# Patient Record
Sex: FEMALE | Race: WHITE | NOT HISPANIC OR LATINO | Employment: UNEMPLOYED | URBAN - METROPOLITAN AREA
[De-identification: names, ages, dates, MRNs, and addresses within clinical notes are randomized per-mention and may not be internally consistent; named-entity substitution may affect disease eponyms.]

---

## 2019-07-09 ENCOUNTER — OFFICE VISIT (OUTPATIENT)
Dept: FAMILY MEDICINE | Facility: CLINIC | Age: 49
End: 2019-07-09
Payer: COMMERCIAL

## 2019-07-09 VITALS
BODY MASS INDEX: 27.84 KG/M2 | OXYGEN SATURATION: 95 % | DIASTOLIC BLOOD PRESSURE: 70 MMHG | HEART RATE: 79 BPM | HEIGHT: 71 IN | WEIGHT: 198.88 LBS | SYSTOLIC BLOOD PRESSURE: 102 MMHG | TEMPERATURE: 98 F

## 2019-07-09 DIAGNOSIS — Z00.00 ANNUAL PHYSICAL EXAM: Primary | ICD-10-CM

## 2019-07-09 DIAGNOSIS — M50.90 CERVICAL DISC DISEASE: ICD-10-CM

## 2019-07-09 PROCEDURE — 90471 IMMUNIZATION ADMIN: CPT | Mod: S$GLB,,, | Performed by: INTERNAL MEDICINE

## 2019-07-09 PROCEDURE — 90715 TDAP VACCINE GREATER THAN OR EQUAL TO 7YO IM: ICD-10-PCS | Mod: S$GLB,,, | Performed by: INTERNAL MEDICINE

## 2019-07-09 PROCEDURE — 99999 PR PBB SHADOW E&M-EST. PATIENT-LVL IV: CPT | Mod: PBBFAC,,, | Performed by: INTERNAL MEDICINE

## 2019-07-09 PROCEDURE — 99999 PR PBB SHADOW E&M-EST. PATIENT-LVL IV: ICD-10-PCS | Mod: PBBFAC,,, | Performed by: INTERNAL MEDICINE

## 2019-07-09 PROCEDURE — 90715 TDAP VACCINE 7 YRS/> IM: CPT | Mod: S$GLB,,, | Performed by: INTERNAL MEDICINE

## 2019-07-09 PROCEDURE — 99386 PREV VISIT NEW AGE 40-64: CPT | Mod: 25,S$GLB,, | Performed by: INTERNAL MEDICINE

## 2019-07-09 PROCEDURE — 99386 PR PREVENTIVE VISIT,NEW,40-64: ICD-10-PCS | Mod: 25,S$GLB,, | Performed by: INTERNAL MEDICINE

## 2019-07-09 PROCEDURE — 90471 TDAP VACCINE GREATER THAN OR EQUAL TO 7YO IM: ICD-10-PCS | Mod: S$GLB,,, | Performed by: INTERNAL MEDICINE

## 2019-07-09 RX ORDER — BROMPHENIRAMINE MALEATE, DEXTROMETHORPHAN HBR, PHENYLEPHRINE HCL, DIPHENHYDRAMINE HCL, PHENYLEPHRINE HCL 0.52G
KIT ORAL
COMMUNITY

## 2019-07-09 NOTE — PROGRESS NOTES
Subjective:       Patient ID: Francesca Garcia is a 48 y.o. female.    Chief Complaint: Establish Care (needs referral for spine)    Pt here to get established.  She lives in [place] [facility] for 9/12 months. She has cervical stenosis that has caused her right hand to go numb.  She has pain turning her head. She would like neurosurg referral.  She gets mammograms over in [place].  She exercises daily.  She is utd gyn exam.     Review of Systems   Constitutional: Negative for fatigue, fever and unexpected weight change.   HENT: Negative for congestion, postnasal drip, sinus pain and sore throat.    Eyes: Negative for visual disturbance.   Respiratory: Negative for cough, chest tightness, shortness of breath and wheezing.    Cardiovascular: Negative for chest pain, palpitations and leg swelling.   Gastrointestinal: Negative for abdominal pain, blood in stool, constipation, diarrhea, nausea and vomiting.   Endocrine: Negative for cold intolerance and heat intolerance.   Genitourinary: Negative for difficulty urinating, dysuria and frequency.   Musculoskeletal: Negative for back pain, joint swelling and myalgias.   Skin: Negative for rash.   Allergic/Immunologic: Negative for environmental allergies.   Neurological: Negative for dizziness, seizures, numbness and headaches.   Hematological: Does not bruise/bleed easily.   Psychiatric/Behavioral: Negative for agitation and sleep disturbance.       Objective:      Physical Exam   Constitutional: She is oriented to person, place, and time. She appears well-developed and well-nourished.   HENT:   Head: Normocephalic and atraumatic.   Mouth/Throat: Oropharynx is clear and moist.   Eyes: Pupils are equal, round, and reactive to light. Conjunctivae and EOM are normal.   Neck: Normal range of motion. Neck supple. No thyromegaly present.   Cardiovascular: Normal rate, regular rhythm, normal heart sounds and intact distal pulses. Exam reveals no gallop and no friction rub.   No  murmur heard.  Pulmonary/Chest: Effort normal and breath sounds normal. No respiratory distress. She has no wheezes. She has no rales.   Abdominal: Soft. Bowel sounds are normal. She exhibits no distension. There is no tenderness.   Musculoskeletal: Normal range of motion. She exhibits no edema.   Lymphadenopathy:     She has no cervical adenopathy.   Neurological: She is alert and oriented to person, place, and time. No cranial nerve deficit.   Skin: Skin is warm and dry.   Psychiatric: She has a normal mood and affect. Her behavior is normal.       Assessment:       1. Annual physical exam    2. Cervical disc disease        Plan:       Annual- dtap today.  Ordered labs. utd gyn exam.  Exercising. Nonsmoker    Cervical stenosis- referred to neuro

## 2019-07-10 ENCOUNTER — LAB VISIT (OUTPATIENT)
Dept: LAB | Facility: HOSPITAL | Age: 49
End: 2019-07-10
Attending: INTERNAL MEDICINE
Payer: COMMERCIAL

## 2019-07-10 DIAGNOSIS — Z00.00 ANNUAL PHYSICAL EXAM: ICD-10-CM

## 2019-07-10 LAB
ALBUMIN SERPL BCP-MCNC: 3.8 G/DL (ref 3.5–5.2)
ALP SERPL-CCNC: 55 U/L (ref 55–135)
ALT SERPL W/O P-5'-P-CCNC: 16 U/L (ref 10–44)
ANION GAP SERPL CALC-SCNC: 7 MMOL/L (ref 8–16)
AST SERPL-CCNC: 21 U/L (ref 10–40)
BASOPHILS # BLD AUTO: 0.07 K/UL (ref 0–0.2)
BASOPHILS NFR BLD: 1.4 % (ref 0–1.9)
BILIRUB SERPL-MCNC: 1.2 MG/DL (ref 0.1–1)
BUN SERPL-MCNC: 15 MG/DL (ref 6–20)
CALCIUM SERPL-MCNC: 9.9 MG/DL (ref 8.7–10.5)
CHLORIDE SERPL-SCNC: 103 MMOL/L (ref 95–110)
CHOLEST SERPL-MCNC: 195 MG/DL (ref 120–199)
CHOLEST/HDLC SERPL: 2.5 {RATIO} (ref 2–5)
CO2 SERPL-SCNC: 28 MMOL/L (ref 23–29)
CREAT SERPL-MCNC: 0.8 MG/DL (ref 0.5–1.4)
DIFFERENTIAL METHOD: NORMAL
EOSINOPHIL # BLD AUTO: 0.2 K/UL (ref 0–0.5)
EOSINOPHIL NFR BLD: 3.8 % (ref 0–8)
ERYTHROCYTE [DISTWIDTH] IN BLOOD BY AUTOMATED COUNT: 13.1 % (ref 11.5–14.5)
EST. GFR  (AFRICAN AMERICAN): >60 ML/MIN/1.73 M^2
EST. GFR  (NON AFRICAN AMERICAN): >60 ML/MIN/1.73 M^2
GLUCOSE SERPL-MCNC: 87 MG/DL (ref 70–110)
HCT VFR BLD AUTO: 46.3 % (ref 37–48.5)
HDLC SERPL-MCNC: 77 MG/DL (ref 40–75)
HDLC SERPL: 39.5 % (ref 20–50)
HGB BLD-MCNC: 15.1 G/DL (ref 12–16)
IMM GRANULOCYTES # BLD AUTO: 0.01 K/UL (ref 0–0.04)
IMM GRANULOCYTES NFR BLD AUTO: 0.2 % (ref 0–0.5)
LDLC SERPL CALC-MCNC: 108.2 MG/DL (ref 63–159)
LYMPHOCYTES # BLD AUTO: 1.6 K/UL (ref 1–4.8)
LYMPHOCYTES NFR BLD: 31.5 % (ref 18–48)
MCH RBC QN AUTO: 30.6 PG (ref 27–31)
MCHC RBC AUTO-ENTMCNC: 32.6 G/DL (ref 32–36)
MCV RBC AUTO: 94 FL (ref 82–98)
MONOCYTES # BLD AUTO: 0.6 K/UL (ref 0.3–1)
MONOCYTES NFR BLD: 12 % (ref 4–15)
NEUTROPHILS # BLD AUTO: 2.6 K/UL (ref 1.8–7.7)
NEUTROPHILS NFR BLD: 51.1 % (ref 38–73)
NONHDLC SERPL-MCNC: 118 MG/DL
NRBC BLD-RTO: 0 /100 WBC
PLATELET # BLD AUTO: 235 K/UL (ref 150–350)
PMV BLD AUTO: 10.9 FL (ref 9.2–12.9)
POTASSIUM SERPL-SCNC: 4.5 MMOL/L (ref 3.5–5.1)
PROT SERPL-MCNC: 7.2 G/DL (ref 6–8.4)
RBC # BLD AUTO: 4.93 M/UL (ref 4–5.4)
SODIUM SERPL-SCNC: 138 MMOL/L (ref 136–145)
TRIGL SERPL-MCNC: 49 MG/DL (ref 30–150)
TSH SERPL DL<=0.005 MIU/L-ACNC: 1.58 UIU/ML (ref 0.4–4)
WBC # BLD AUTO: 5.01 K/UL (ref 3.9–12.7)

## 2019-07-10 PROCEDURE — 80061 LIPID PANEL: CPT

## 2019-07-10 PROCEDURE — 36415 COLL VENOUS BLD VENIPUNCTURE: CPT | Mod: PO

## 2019-07-10 PROCEDURE — 84443 ASSAY THYROID STIM HORMONE: CPT

## 2019-07-10 PROCEDURE — 85025 COMPLETE CBC W/AUTO DIFF WBC: CPT

## 2019-07-10 PROCEDURE — 80053 COMPREHEN METABOLIC PANEL: CPT

## 2019-07-16 ENCOUNTER — TELEPHONE (OUTPATIENT)
Dept: FAMILY MEDICINE | Facility: CLINIC | Age: 49
End: 2019-07-16

## 2019-07-16 DIAGNOSIS — M50.90 CERVICAL DISC DISEASE: Primary | ICD-10-CM

## 2019-07-16 NOTE — TELEPHONE ENCOUNTER
----- Message from Nidia Cantu sent at 7/16/2019  4:24 PM CDT -----    Pt  Is calling  Back to  Change the  Name  Of the spine  Surgeon// a  Ref   Request  Has  Already  Been  Sent  Pt is  changing the  Dr  Name  Krishna Baldwin // please call  For details [phone]   Fax # for  Dr  Is [phone]    
Please see previous message. She want Referral to be send to DR. Krishna Baldwin  
none

## 2019-07-16 NOTE — TELEPHONE ENCOUNTER
----- Message from Marco A Blood sent at 7/16/2019  2:18 PM CDT -----  Contact: Patient  Type: Needs Medical Advice    Who Called:  Patient  Best Call Back Number: [phone]  Additional Information: Patient would like referral for neurosurgery to be sent to the [facility] back/spine clinic. Fax number is [phone]. Please call to advise. Thanks!

## 2019-07-17 ENCOUNTER — TELEPHONE (OUTPATIENT)
Dept: FAMILY MEDICINE | Facility: CLINIC | Age: 49
End: 2019-07-17

## 2019-07-18 ENCOUNTER — TELEPHONE (OUTPATIENT)
Dept: FAMILY MEDICINE | Facility: CLINIC | Age: 49
End: 2019-07-18

## 2019-08-09 ENCOUNTER — OFFICE VISIT (OUTPATIENT)
Dept: NEUROSURGERY | Facility: CLINIC | Age: 49
End: 2019-08-09
Payer: COMMERCIAL

## 2019-08-09 VITALS — HEART RATE: 80 BPM | SYSTOLIC BLOOD PRESSURE: 115 MMHG | DIASTOLIC BLOOD PRESSURE: 70 MMHG

## 2019-08-09 DIAGNOSIS — M54.2 NECK PAIN: Primary | ICD-10-CM

## 2019-08-09 DIAGNOSIS — M50.30 DDD (DEGENERATIVE DISC DISEASE), CERVICAL: ICD-10-CM

## 2019-08-09 DIAGNOSIS — R20.0 NUMBNESS IN BOTH HANDS: ICD-10-CM

## 2019-08-09 PROCEDURE — 99999 PR PBB SHADOW E&M-EST. PATIENT-LVL III: CPT | Mod: PBBFAC,,, | Performed by: PHYSICIAN ASSISTANT

## 2019-08-09 PROCEDURE — 99204 OFFICE O/P NEW MOD 45 MIN: CPT | Mod: S$GLB,,, | Performed by: PHYSICIAN ASSISTANT

## 2019-08-09 PROCEDURE — 99204 PR OFFICE/OUTPT VISIT, NEW, LEVL IV, 45-59 MIN: ICD-10-PCS | Mod: S$GLB,,, | Performed by: PHYSICIAN ASSISTANT

## 2019-08-09 PROCEDURE — 99999 PR PBB SHADOW E&M-EST. PATIENT-LVL III: ICD-10-PCS | Mod: PBBFAC,,, | Performed by: PHYSICIAN ASSISTANT

## 2019-08-09 NOTE — LETTER
August 15, 2019      Sandy Burton MD  [address] [facility] [address] [place] [address]           [place] - Neurosurgery  [address] [facility] [address] [place] [address]  Phone: [phone]  Fax: [phone]          Patient: Francesca Garcia   MR Number: [id number]   YOB: 1970   Date of Visit: 8/9/2019       Dear Dr. Sandy Burton:    Thank you for referring Francesca Garcia to me for evaluation. Attached you will find relevant portions of my assessment and plan of care.    If you have questions, please do not hesitate to call me. I look forward to following Francesca Garcia along with you.    Sincerely,    Patsy Pedro PA-C    Enclosure  CC:  No Recipients    If you would like to receive this communication electronically, please contact [email or web address] or [phone] to request more information on [facility] access.    For providers and/or their staff who would like to refer a patient to [facility], please contact us through our one-stop-shop provider referral line, [facility] , at [phone].    If you feel you have received this communication in error or would no longer like to receive these types of communications, please e-mail [email or web address]

## 2019-08-09 NOTE — PROGRESS NOTES
[facility] Neurosurgery  Clinic Consult     Consult Requested By: Sandy Burton MD  PCP: Sandy Burton MD    Chief Complaint:   Chief Complaint   Patient presents with    Cervical Spine Pain (C-spine)     patient reports cervical pain; bilateral hands tingling; pain 4/10         History reviewed. No pertinent past medical history.  Past Surgical History:   Procedure Laterality Date    BREAST BIOPSY Left 2008    not sure of date-@ 's office    KIDNEY STONE SURGERY       Family History   Problem Relation Age of Onset    Cancer Maternal Aunt      Social History     Tobacco Use    Smoking status: Former Smoker     Years: 15.00   Substance Use Topics    Alcohol use: Not on file    Drug use: Not on file      Review of patient's allergies indicates:  No Known Allergies    Current Outpatient Medications:     multivitamin capsule, Take 1 capsule by mouth once daily., Disp: , Rfl:     psyllium 0.52 gram capsule, Fiber (psyllium husk) 0.52 gram capsule  Take 4 capsules every day by oral route for 30 days., Disp: , Rfl:     VITAMIN B COMP AND VIT C NO.6 (VITAMIN B COMP WITH VIT C NO.6 ORAL), Take 1,200 mcg by mouth., Disp: , Rfl:     Review of Systems:   Constitutional: no fever, chills or night sweats. No changes in weight   Eyes: no visual changes   ENT: no nasal congestion or sore throat   Respiratory: no cough or shortness of breath   Cardiovascular: no chest pain or palpitations   Gastrointestinal: no nausea or vomiting   Genitourinary: no hematuria or dysuria   Integument/Breast: no rash or pruritis   Hematologic/Lymphatic: no easy bruising or lymphadenopathy   Musculoskeletal: +myalgias, neck pain   Neurological: no seizures or tremors +numbness  Behavioral/Psych: no auditory or visual hallucinations   Endocrine: no heat or cold intolerance         OBJECTIVE:     Vital Signs (Most Recent):  Pulse: 80 (08/09/19 1417)  BP: 115/70 (08/09/19 1417)    Physical Exam:   General: well developed, well  nourished, no distress.   Neurologic: Alert and oriented. Thought content appropriate. GCS 15.   Head: normocephalic, atraumatic  Eyes:EOMI.  Neck: trachea midline, no JVD   Cardiovascular: no LE edema  Pulmonary: normal respirations, no signs of respiratory distress  Abdomen: non-distended  Sensory: intact to light touch throughout  Skin: Skin is warm, dry and intact.    Motor Strength: Moves all extremities spontaneously with good tone. No abnormal movements seen.     Strength  Deltoids Triceps Biceps Wrist Extension Wrist Flexion Hand  Interossei   Upper: R 5/5 5/5 5/5 5/5 5/5 5/5 5/5    L 5/5 5/5 5/5 5/5 5/5 5/5 5/5     DTR's: 2 + and symmetric in UE and LE  Zamorano: absent  Gait: normal    Tandem Gait: No difficulty           Able to walk on heels & toes  Cervical Spine: full ROM, no TTP         Provider Dictation:     Francesca Garcia is a 48 y.o. right handed female who presents for evaluation of neck pain and hand numbness. Patient reports she has been experiencing these symptoms for a few years. She previously had a cervical spine MRI 2 years ago and was told she has stenosis in the cervical spine. She currently is experiencing pain in the right neck. She has numbness in bilateral hands, R>L. She reports weakness in her hands. Denies dropping objects or gait instability. She lives part time in [facility]. She returns next week and will not come back to [place] until October. She has done therapy, but has not received injections with pain management.     No imaging available.     On exam, patient has a fluid gait, no difficulty with tandem. She is full strength, 2+ reflexes and negative Zamorano's     VAS 4/10 neck   PHQ 0  Neck Disability Index 20    Given the above, I recommend MRI cervical spine and x-rays with flex/ex. Patient will complete after she returns in October. I will call once complete to give further recommendations.     Patient verbalized understanding of plan. Encouraged to call with  any questions or concerns.     Neck pain  -     MRI Cervical Spine Without Contrast; Future; Expected date: 08/09/2019  -     X-Ray Cervical Spine AP Lat with Flexion  Extension; Future; Expected date: 08/09/2019    DDD (degenerative disc disease), cervical  -     MRI Cervical Spine Without Contrast; Future; Expected date: 08/09/2019  -     X-Ray Cervical Spine AP Lat with Flexion  Extension; Future; Expected date: 08/09/2019    Numbness in both hands  -     MRI Cervical Spine Without Contrast; Future; Expected date: 08/09/2019  -     X-Ray Cervical Spine AP Lat with Flexion  Extension; Future; Expected date: 08/09/2019

## 2020-01-03 DIAGNOSIS — Z12.11 COLON CANCER SCREENING: ICD-10-CM

## 2020-10-05 ENCOUNTER — PATIENT MESSAGE (OUTPATIENT)
Dept: ADMINISTRATIVE | Facility: HOSPITAL | Age: 50
End: 2020-10-05

## 2021-01-04 ENCOUNTER — PATIENT MESSAGE (OUTPATIENT)
Dept: ADMINISTRATIVE | Facility: HOSPITAL | Age: 51
End: 2021-01-04

## 2021-01-06 DIAGNOSIS — Z12.31 OTHER SCREENING MAMMOGRAM: ICD-10-CM

## 2021-04-06 ENCOUNTER — PATIENT MESSAGE (OUTPATIENT)
Dept: ADMINISTRATIVE | Facility: HOSPITAL | Age: 51
End: 2021-04-06

## 2021-05-10 ENCOUNTER — PATIENT MESSAGE (OUTPATIENT)
Dept: RESEARCH | Facility: HOSPITAL | Age: 51
End: 2021-05-10

## 2021-07-07 ENCOUNTER — PATIENT MESSAGE (OUTPATIENT)
Dept: ADMINISTRATIVE | Facility: HOSPITAL | Age: 51
End: 2021-07-07

## 2022-03-16 DIAGNOSIS — J32.8 OTHER CHRONIC SINUSITIS: Primary | ICD-10-CM

## 2022-03-22 ENCOUNTER — HOSPITAL ENCOUNTER (OUTPATIENT)
Dept: RADIOLOGY | Facility: HOSPITAL | Age: 52
Discharge: HOME | End: 2022-03-22
Attending: OTOLARYNGOLOGY
Payer: COMMERCIAL

## 2022-03-22 DIAGNOSIS — J32.8 OTHER CHRONIC SINUSITIS: ICD-10-CM

## 2022-03-22 PROCEDURE — 70486 CT MAXILLOFACIAL W/O DYE: CPT | Mod: TC,PO

## 2022-04-22 ENCOUNTER — ANESTHESIA EVENT (OUTPATIENT)
Dept: SURGERY | Facility: AMBULARY SURGERY CENTER | Age: 52
End: 2022-04-22
Payer: COMMERCIAL

## 2022-04-22 ENCOUNTER — HOSPITAL ENCOUNTER (OUTPATIENT)
Dept: CARDIOLOGY | Facility: HOSPITAL | Age: 52
Discharge: HOME | End: 2022-04-22
Attending: ANESTHESIOLOGY
Payer: COMMERCIAL

## 2022-04-22 DIAGNOSIS — Z01.818 PREOP TESTING: Primary | ICD-10-CM

## 2022-04-22 DIAGNOSIS — Z01.818 PREOP TESTING: ICD-10-CM

## 2022-04-22 PROCEDURE — 93010 EKG 12-LEAD: ICD-10-PCS | Mod: ,,, | Performed by: INTERNAL MEDICINE

## 2022-04-22 PROCEDURE — 93005 ELECTROCARDIOGRAM TRACING: CPT

## 2022-04-22 PROCEDURE — 93010 ELECTROCARDIOGRAM REPORT: CPT | Mod: ,,, | Performed by: INTERNAL MEDICINE

## 2022-04-22 RX ORDER — HYDROCODONE BITARTRATE AND ACETAMINOPHEN 5; 325 MG/1; MG/1
1 TABLET ORAL EVERY 6 HOURS PRN
COMMUNITY

## 2022-04-25 ENCOUNTER — ANESTHESIA (OUTPATIENT)
Dept: SURGERY | Facility: AMBULARY SURGERY CENTER | Age: 52
End: 2022-04-25
Payer: COMMERCIAL

## 2022-04-25 ENCOUNTER — HOSPITAL ENCOUNTER (OUTPATIENT)
Facility: AMBULARY SURGERY CENTER | Age: 52
Discharge: HOME | End: 2022-04-25
Attending: OTOLARYNGOLOGY | Admitting: OTOLARYNGOLOGY
Payer: COMMERCIAL

## 2022-04-25 DIAGNOSIS — J32.2 CHRONIC ETHMOIDAL SINUSITIS: ICD-10-CM

## 2022-04-25 DIAGNOSIS — J01.41 ACUTE RECURRENT PANSINUSITIS: Primary | ICD-10-CM

## 2022-04-25 LAB
B-HCG UR QL: NEGATIVE
CTP QC/QA: YES

## 2022-04-25 PROCEDURE — D9220A PRA ANESTHESIA: ICD-10-PCS | Mod: ANES,,, | Performed by: ANESTHESIOLOGY

## 2022-04-25 PROCEDURE — 31267 ENDOSCOPY MAXILLARY SINUS: CPT | Performed by: OTOLARYNGOLOGY

## 2022-04-25 PROCEDURE — 31276 NSL/SINS NDSC FRNT TISS RMVL: CPT | Mod: LT

## 2022-04-25 PROCEDURE — 31267 ENDOSCOPY MAXILLARY SINUS: CPT | Mod: LT

## 2022-04-25 PROCEDURE — D9220A PRA ANESTHESIA: Mod: CRNA,,, | Performed by: NURSE ANESTHETIST, CERTIFIED REGISTERED

## 2022-04-25 PROCEDURE — 88305 TISSUE EXAM BY PATHOLOGIST: CPT | Mod: 26,,, | Performed by: PATHOLOGY

## 2022-04-25 PROCEDURE — 30130 EXCISE INFERIOR TURBINATE: CPT | Mod: RT | Performed by: OTOLARYNGOLOGY

## 2022-04-25 PROCEDURE — 31259 NSL/SINS NDSC SPHN TISS RMVL: CPT | Mod: RT

## 2022-04-25 PROCEDURE — 30140 RESECT INFERIOR TURBINATE: CPT | Mod: LT | Performed by: OTOLARYNGOLOGY

## 2022-04-25 PROCEDURE — 31276 NSL/SINS NDSC FRNT TISS RMVL: CPT | Performed by: OTOLARYNGOLOGY

## 2022-04-25 PROCEDURE — D9220A PRA ANESTHESIA: Mod: ANES,,, | Performed by: ANESTHESIOLOGY

## 2022-04-25 PROCEDURE — D9220A PRA ANESTHESIA: ICD-10-PCS | Mod: CRNA,,, | Performed by: NURSE ANESTHETIST, CERTIFIED REGISTERED

## 2022-04-25 PROCEDURE — 88305 TISSUE EXAM BY PATHOLOGIST: CPT | Performed by: PATHOLOGY

## 2022-04-25 PROCEDURE — 88305 TISSUE EXAM BY PATHOLOGIST: ICD-10-PCS | Mod: 26,,, | Performed by: PATHOLOGY

## 2022-04-25 PROCEDURE — 61782 SCAN PROC CRANIAL EXTRA: CPT

## 2022-04-25 PROCEDURE — 30520 REPAIR OF NASAL SEPTUM: CPT | Performed by: OTOLARYNGOLOGY

## 2022-04-25 RX ORDER — PROMETHAZINE HYDROCHLORIDE 25 MG/ML
INJECTION, SOLUTION INTRAMUSCULAR; INTRAVENOUS
Status: DISCONTINUED | OUTPATIENT
Start: 2022-04-25 | End: 2022-04-25

## 2022-04-25 RX ORDER — FENTANYL CITRATE 50 UG/ML
25 INJECTION, SOLUTION INTRAMUSCULAR; INTRAVENOUS EVERY 5 MIN PRN
Status: DISCONTINUED | OUTPATIENT
Start: 2022-04-25 | End: 2022-04-25 | Stop reason: HOSPADM

## 2022-04-25 RX ORDER — MIDAZOLAM HYDROCHLORIDE 1 MG/ML
INJECTION INTRAMUSCULAR; INTRAVENOUS
Status: DISCONTINUED | OUTPATIENT
Start: 2022-04-25 | End: 2022-04-25

## 2022-04-25 RX ORDER — SODIUM CHLORIDE 0.9 % (FLUSH) 0.9 %
3 SYRINGE (ML) INJECTION EVERY 8 HOURS
Status: DISCONTINUED | OUTPATIENT
Start: 2022-04-25 | End: 2022-04-25 | Stop reason: HOSPADM

## 2022-04-25 RX ORDER — ROCURONIUM BROMIDE 10 MG/ML
INJECTION, SOLUTION INTRAVENOUS
Status: DISCONTINUED | OUTPATIENT
Start: 2022-04-25 | End: 2022-04-25

## 2022-04-25 RX ORDER — EPHEDRINE SULFATE 50 MG/ML
INJECTION, SOLUTION INTRAVENOUS
Status: DISCONTINUED | OUTPATIENT
Start: 2022-04-25 | End: 2022-04-25

## 2022-04-25 RX ORDER — LIDOCAINE HYDROCHLORIDE AND EPINEPHRINE 10; 10 MG/ML; UG/ML
INJECTION, SOLUTION INFILTRATION; PERINEURAL
Status: DISCONTINUED | OUTPATIENT
Start: 2022-04-25 | End: 2022-04-25 | Stop reason: HOSPADM

## 2022-04-25 RX ORDER — FENTANYL CITRATE 50 UG/ML
INJECTION, SOLUTION INTRAMUSCULAR; INTRAVENOUS
Status: DISCONTINUED | OUTPATIENT
Start: 2022-04-25 | End: 2022-04-25

## 2022-04-25 RX ORDER — TRANEXAMIC ACID 100 MG/ML
INJECTION, SOLUTION INTRAVENOUS
Status: DISCONTINUED | OUTPATIENT
Start: 2022-04-25 | End: 2022-04-25

## 2022-04-25 RX ORDER — SODIUM CHLORIDE, SODIUM LACTATE, POTASSIUM CHLORIDE, CALCIUM CHLORIDE 600; 310; 30; 20 MG/100ML; MG/100ML; MG/100ML; MG/100ML
INJECTION, SOLUTION INTRAVENOUS CONTINUOUS
Status: DISCONTINUED | OUTPATIENT
Start: 2022-04-25 | End: 2022-04-25 | Stop reason: HOSPADM

## 2022-04-25 RX ORDER — LIDOCAINE HYDROCHLORIDE 10 MG/ML
0.5 INJECTION, SOLUTION EPIDURAL; INFILTRATION; INTRACAUDAL; PERINEURAL ONCE
Status: DISCONTINUED | OUTPATIENT
Start: 2022-04-25 | End: 2022-04-25 | Stop reason: HOSPADM

## 2022-04-25 RX ORDER — SCOLOPAMINE TRANSDERMAL SYSTEM 1 MG/1
PATCH, EXTENDED RELEASE TRANSDERMAL
Status: COMPLETED
Start: 2022-04-25 | End: 2022-04-25

## 2022-04-25 RX ORDER — SUCCINYLCHOLINE CHLORIDE 20 MG/ML
INJECTION INTRAMUSCULAR; INTRAVENOUS
Status: DISCONTINUED | OUTPATIENT
Start: 2022-04-25 | End: 2022-04-25

## 2022-04-25 RX ORDER — TRIAMCINOLONE ACETONIDE 40 MG/ML
INJECTION, SUSPENSION INTRA-ARTICULAR; INTRAMUSCULAR
Status: DISCONTINUED
Start: 2022-04-25 | End: 2022-04-25 | Stop reason: HOSPADM

## 2022-04-25 RX ORDER — DIPHENHYDRAMINE HYDROCHLORIDE 50 MG/ML
25 INJECTION INTRAMUSCULAR; INTRAVENOUS EVERY 6 HOURS PRN
Status: DISCONTINUED | OUTPATIENT
Start: 2022-04-25 | End: 2022-04-25 | Stop reason: HOSPADM

## 2022-04-25 RX ORDER — LIDOCAINE HCL/PF 100 MG/5ML
SYRINGE (ML) INTRAVENOUS
Status: DISCONTINUED | OUTPATIENT
Start: 2022-04-25 | End: 2022-04-25

## 2022-04-25 RX ORDER — LIDOCAINE HYDROCHLORIDE 10 MG/ML
INJECTION, SOLUTION EPIDURAL; INFILTRATION; INTRACAUDAL; PERINEURAL
Status: DISCONTINUED
Start: 2022-04-25 | End: 2022-04-25 | Stop reason: HOSPADM

## 2022-04-25 RX ORDER — HYDROCODONE BITARTRATE AND ACETAMINOPHEN 5; 325 MG/1; MG/1
1 TABLET ORAL EVERY 4 HOURS PRN
Status: DISCONTINUED | OUTPATIENT
Start: 2022-04-25 | End: 2022-04-25 | Stop reason: HOSPADM

## 2022-04-25 RX ORDER — TRIAMCINOLONE ACETONIDE 40 MG/ML
INJECTION, SUSPENSION INTRA-ARTICULAR; INTRAMUSCULAR
Status: DISCONTINUED | OUTPATIENT
Start: 2022-04-25 | End: 2022-04-25 | Stop reason: HOSPADM

## 2022-04-25 RX ORDER — ACETAMINOPHEN 10 MG/ML
INJECTION, SOLUTION INTRAVENOUS
Status: DISCONTINUED | OUTPATIENT
Start: 2022-04-25 | End: 2022-04-25

## 2022-04-25 RX ORDER — OXYMETAZOLINE HCL 0.05 %
2 SPRAY, NON-AEROSOL (ML) NASAL ONCE
Status: COMPLETED | OUTPATIENT
Start: 2022-04-25 | End: 2022-04-25

## 2022-04-25 RX ORDER — SODIUM CHLORIDE 0.9 G/100ML
IRRIGANT IRRIGATION
Status: DISCONTINUED | OUTPATIENT
Start: 2022-04-25 | End: 2022-04-25 | Stop reason: HOSPADM

## 2022-04-25 RX ORDER — OXYCODONE HYDROCHLORIDE 5 MG/1
5 TABLET ORAL
Status: DISCONTINUED | OUTPATIENT
Start: 2022-04-25 | End: 2022-04-25 | Stop reason: HOSPADM

## 2022-04-25 RX ORDER — HYDROMORPHONE HYDROCHLORIDE 1 MG/ML
0.2 INJECTION, SOLUTION INTRAMUSCULAR; INTRAVENOUS; SUBCUTANEOUS EVERY 5 MIN PRN
Status: DISCONTINUED | OUTPATIENT
Start: 2022-04-25 | End: 2022-04-25 | Stop reason: HOSPADM

## 2022-04-25 RX ORDER — CEFAZOLIN SODIUM 1 G/3ML
INJECTION, POWDER, FOR SOLUTION INTRAMUSCULAR; INTRAVENOUS
Status: DISCONTINUED | OUTPATIENT
Start: 2022-04-25 | End: 2022-04-25

## 2022-04-25 RX ORDER — MEPERIDINE HYDROCHLORIDE 50 MG/ML
12.5 INJECTION INTRAMUSCULAR; INTRAVENOUS; SUBCUTANEOUS ONCE AS NEEDED
Status: DISCONTINUED | OUTPATIENT
Start: 2022-04-25 | End: 2022-04-25 | Stop reason: HOSPADM

## 2022-04-25 RX ORDER — ONDANSETRON HYDROCHLORIDE 2 MG/ML
INJECTION, SOLUTION INTRAMUSCULAR; INTRAVENOUS
Status: DISCONTINUED | OUTPATIENT
Start: 2022-04-25 | End: 2022-04-25

## 2022-04-25 RX ORDER — ONDANSETRON 2 MG/ML
4 INJECTION INTRAMUSCULAR; INTRAVENOUS DAILY PRN
Status: DISCONTINUED | OUTPATIENT
Start: 2022-04-25 | End: 2022-04-25 | Stop reason: HOSPADM

## 2022-04-25 RX ORDER — EPINEPHRINE CONVENIENCE KIT 1 MG/ML(1)
KIT INTRAMUSCULAR; SUBCUTANEOUS
Status: DISCONTINUED | OUTPATIENT
Start: 2022-04-25 | End: 2022-04-25 | Stop reason: HOSPADM

## 2022-04-25 RX ORDER — PROPOFOL 10 MG/ML
VIAL (ML) INTRAVENOUS
Status: DISCONTINUED | OUTPATIENT
Start: 2022-04-25 | End: 2022-04-25

## 2022-04-25 RX ORDER — DEXAMETHASONE SODIUM PHOSPHATE 4 MG/ML
INJECTION, SOLUTION INTRA-ARTICULAR; INTRALESIONAL; INTRAMUSCULAR; INTRAVENOUS; SOFT TISSUE
Status: DISCONTINUED | OUTPATIENT
Start: 2022-04-25 | End: 2022-04-25

## 2022-04-25 RX ORDER — SODIUM CHLORIDE 0.9 % (FLUSH) 0.9 %
3 SYRINGE (ML) INJECTION
Status: DISCONTINUED | OUTPATIENT
Start: 2022-04-25 | End: 2022-04-25 | Stop reason: HOSPADM

## 2022-04-25 RX ADMIN — SUCCINYLCHOLINE CHLORIDE 140 MG: 20 INJECTION INTRAMUSCULAR; INTRAVENOUS at 08:04

## 2022-04-25 RX ADMIN — MIDAZOLAM HYDROCHLORIDE 2 MG: 1 INJECTION INTRAMUSCULAR; INTRAVENOUS at 08:04

## 2022-04-25 RX ADMIN — Medication 2 SPRAY: at 07:04

## 2022-04-25 RX ADMIN — SCOLOPAMINE TRANSDERMAL SYSTEM 1 PATCH: 1 PATCH, EXTENDED RELEASE TRANSDERMAL at 07:04

## 2022-04-25 RX ADMIN — Medication 80 MG: at 08:04

## 2022-04-25 RX ADMIN — OXYCODONE HYDROCHLORIDE 5 MG: 5 TABLET ORAL at 11:04

## 2022-04-25 RX ADMIN — PROMETHAZINE HYDROCHLORIDE 6.25 MG: 25 INJECTION, SOLUTION INTRAMUSCULAR; INTRAVENOUS at 08:04

## 2022-04-25 RX ADMIN — ONDANSETRON HYDROCHLORIDE 4 MG: 2 INJECTION, SOLUTION INTRAMUSCULAR; INTRAVENOUS at 10:04

## 2022-04-25 RX ADMIN — CEFAZOLIN SODIUM 2 G: 1 INJECTION, POWDER, FOR SOLUTION INTRAMUSCULAR; INTRAVENOUS at 08:04

## 2022-04-25 RX ADMIN — EPHEDRINE SULFATE 5 MG: 50 INJECTION, SOLUTION INTRAVENOUS at 09:04

## 2022-04-25 RX ADMIN — SODIUM CHLORIDE, SODIUM LACTATE, POTASSIUM CHLORIDE, CALCIUM CHLORIDE: 600; 310; 30; 20 INJECTION, SOLUTION INTRAVENOUS at 09:04

## 2022-04-25 RX ADMIN — FENTANYL CITRATE 25 MCG: 50 INJECTION, SOLUTION INTRAMUSCULAR; INTRAVENOUS at 09:04

## 2022-04-25 RX ADMIN — Medication 150 MG: at 08:04

## 2022-04-25 RX ADMIN — FENTANYL CITRATE 50 MCG: 50 INJECTION, SOLUTION INTRAMUSCULAR; INTRAVENOUS at 10:04

## 2022-04-25 RX ADMIN — EPHEDRINE SULFATE 10 MG: 50 INJECTION, SOLUTION INTRAVENOUS at 08:04

## 2022-04-25 RX ADMIN — ROCURONIUM BROMIDE 5 MG: 10 INJECTION, SOLUTION INTRAVENOUS at 08:04

## 2022-04-25 RX ADMIN — ACETAMINOPHEN 1000 MG: 10 INJECTION, SOLUTION INTRAVENOUS at 08:04

## 2022-04-25 RX ADMIN — FENTANYL CITRATE 100 MCG: 50 INJECTION, SOLUTION INTRAMUSCULAR; INTRAVENOUS at 08:04

## 2022-04-25 RX ADMIN — EPHEDRINE SULFATE 10 MG: 50 INJECTION, SOLUTION INTRAVENOUS at 09:04

## 2022-04-25 RX ADMIN — SODIUM CHLORIDE, SODIUM LACTATE, POTASSIUM CHLORIDE, CALCIUM CHLORIDE: 600; 310; 30; 20 INJECTION, SOLUTION INTRAVENOUS at 07:04

## 2022-04-25 RX ADMIN — ONDANSETRON HYDROCHLORIDE 4 MG: 2 INJECTION, SOLUTION INTRAMUSCULAR; INTRAVENOUS at 08:04

## 2022-04-25 RX ADMIN — DEXAMETHASONE SODIUM PHOSPHATE 12 MG: 4 INJECTION, SOLUTION INTRA-ARTICULAR; INTRALESIONAL; INTRAMUSCULAR; INTRAVENOUS; SOFT TISSUE at 08:04

## 2022-04-25 RX ADMIN — TRANEXAMIC ACID 1000 MG: 100 INJECTION, SOLUTION INTRAVENOUS at 09:04

## 2022-04-25 NOTE — ANESTHESIA POSTPROCEDURE EVALUATION
Anesthesia Post Evaluation    Patient: Francesca Garcia    Procedure(s) Performed: Procedure(s) (LRB):  SEPTOPLASTY, NOSE (Bilateral)  FESS, USING COMPUTER-ASSISTED NAVIGATION (Bilateral)  REDUCTION, NASAL TURBINATE (Bilateral)    Final Anesthesia Type: general      Patient location during evaluation: PACU  Patient participation: Yes- Able to Participate  Level of consciousness: awake and alert  Post-procedure vital signs: reviewed and stable  Pain management: adequate  Airway patency: patent    PONV status at discharge: No PONV  Anesthetic complications: no      Cardiovascular status: blood pressure returned to baseline and hypertensive  Respiratory status: unassisted  Hydration status: euvolemic  Follow-up not needed.          Vitals Value Taken Time   /94 04/25/22 1045   Temp 36.6 °C (97.9 °F) 04/25/22 1025   Pulse 85 04/25/22 1046   Resp 16 04/25/22 1040   SpO2 96 % 04/25/22 1046   Vitals shown include unvalidated device data.      No case tracking events are documented in the log.      Pain/Otilio Score: No data recorded

## 2022-04-25 NOTE — TRANSFER OF CARE
"Anesthesia Transfer of Care Note    Patient: Francesca Garcia    Procedure(s) Performed: Procedure(s) (LRB):  SEPTOPLASTY, NOSE (Bilateral)  FESS, USING COMPUTER-ASSISTED NAVIGATION (Bilateral)  REDUCTION, NASAL TURBINATE (Bilateral)    Patient location: PACU    Anesthesia Type: general    Transport from OR: Transported from OR on 2-3 L/min O2 by NC with adequate spontaneous ventilation    Post pain: adequate analgesia    Post assessment: no apparent anesthetic complications and tolerated procedure well    Post vital signs: stable    Level of consciousness: awake    Nausea/Vomiting: no nausea/vomiting    Complications: none    Transfer of care protocol was followed      Last vitals:   Visit Vitals  /74   Pulse 77   Temp 36.2 °C (97.2 °F) (Skin)   Resp 20   Ht 5' 10.5" (1.791 m)   Wt 99.8 kg (220 lb)   SpO2 97%   Breastfeeding No   BMI 31.12 kg/m²     "

## 2022-04-25 NOTE — OP NOTE
ENT OPERATIVE NOTE    DATE OF SURGERY: 04/25/2022    ATTENDING SURGEON:  Kevin Tanner MD    ANESTHESIA:  General endotracheal anesthesia.    PREOPERATIVE DIAGNOSIS:   1. Acute recurrent pan sinusitis  2. Chronic bilateral maxillary sinusitis.   3. Chronic bilateral frontal sinusitis.   4. Chronic bilateral ethmoid sinusitis.   5. Chronic bilateral sphenoid sinusitis.  6. Bilateral turbinate hypertrophy  7. Nasal septal deviation    POSTOPERATIVE DIAGNOSIS:   Same     PROCEDURE:   1. Endoscopic bilateral maxillary sinusotomy with removal of contents bilaterally  2. Endoscopic bilateral frontal sinusotomy.  3. Endoscopic bilateral total ethmoidectomy.   4. Endoscopic bilateral sphenoidotomy  with removal of contents bilaterally  5. Use of image guidance.  6. Bilateral partial resection of inferior turbinates  7. Nasal septoplasty    INDICATIONS:  This is a 51 y.o. female with a history of the above diagnosis.  The CT scan revealed frontal and sphenoid surgery.  Recommendations were made for sinus surgery.  The risks, benefits, and alternatives to sinus surgery were discussed with the patient who wished to proceed.    OPERATIVE FINDINGS:  - Significant right caudal septal deviation  - Inflamed sinus mucosa throughout    ANESTHETIC AND POSITIONING:  The patient was taken to the operating room and placed in a supine position where general endotracheal anesthesia was established without difficulty.  The patient was placed in a beach chair position.  The eyes were taped shut laterally so that the medial eye could be viewed as needed and the patient was adequately padded.    IMAGE GUIDANCE:  A preoperative image guidance compatible scan was obtained due to the indication of frontal and sphenoid surgery and was used to plan the surgery.   The system was registered and verified with an acceptable degree of accuracy.  The system was used for navigation throughout the case.    NASAL CAVITY PREPARATION:  Decongestion:   Epinephrine 1:2000 applied topically on pledgets.     Injection:   Lidocaine 1% with epinephrine 1:100,000 was injected in the operative field for hemostasis.    NASAL ENDOSCOPY:  The nasopharynx, inferior nasal cavity, middle meatus, olfactory region, and sphenoethmoidal recess were examined bilaterally with nasal endoscopy.  The remainder of the case was performed via endoscopes working off a video monitor.   Findings:   - Right caudal septal deviation    DESCRIPTION OF PROCEDURE:    Maxillary Sinusotomy:  Right:  Uncinate removed.  Primary ostium cannulated and opened widely into the posterior fontanelle.    Findings:  Inflamed mucosa, no polyps or purulence, sinus contents sent for pathology with sinus contents  Left:  Uncinate removed.  Primary ostium cannulated and opened widely into the posterior fontanelle.   Findings: Inflamed mucosa, no polyps or purulence, sinus contents sent for pathology with sinus contents    Anterior Ethmoidectomy:  Right: Bulla entered in inferomedial fashion with the anterior face removed.  Remaining septae removed to open the anterior ethmoid cavity.  Findings: Inflamed mucosa, no polyps or purulence  Left: Bulla entered in inferomedial fashion with the anterior face removed.  Remaining septae removed to open the anterior ethmoid cavity.  Findings: Inflamed mucosa, no polyps or purulence    Posterior Ethmoidectomy:  Right:  The basal lamella was opened in the inferiomedial aspect to enter the posterior ethmoid cells.  The remainder of the septae were removed to open the posterior ethmoid cavity.  Findings: Inflamed mucosa, no polyps or purulence   Left:  The basal lamella was opened in the inferomedial aspect to enter the posterior ethmoid cells.  The remainder of the septae were removed to open the posterior ethmoid cavity.  Findings: Inflamed mucosa, no polyps or purulence    Frontal Sinusotomy:  Right:  A lighted guidewire was passed into the frontal sinus with a good light  pattern seen. The balloon was introduced into the sinus over the wire, and two inflations were completed.   Bony septae and soft tissue were removed to complete a wide frontal sinusotomy.  Findings:  Inflamed mucosa, no polyps or purulence  Left:  A lighted guidewire was passed into the frontal sinus with a good light pattern seen. The balloon was introduced into the sinus over the wire, and two inflations were completed.   Bony septae and soft tissue were removed to complete a wide frontal sinusotomy.  Findings: Inflamed mucosa, no polyps or purulence      Sphenoid Sinusotomy:  Right: Cannulated with image guided probe and bluntly widened.  Curette and rongeurs used to open widely in an inferomedial direction.  Findings: Inflamed mucosa, no polyps or purulence   Left:  Cannulated with image guided probe and bluntly widened.  Curette and rongeurs used to open widely in an inferomedial direction.  Findings: Inflamed mucosa, no polyps or purulence    Inferior Turbinate Resection:  Right:  The turbinate was medialized. The microdebrider turbinate blade was used to submucosally remove the turbinate tissue along with partial excision of the bone and inferior mucosa. The turbinate was then outfractured with the Bowels elevator  Findings: Hypertrophied inferior turbinate.  Left: The turbinate was medialized. The microdebrider turbinate blade was used to submucosally remove the turbinate tissue along with partial excision of the bone and inferior mucosa. The turbinate was then outfractured with the Bowles elevator  Findings: Hypertrophied inferior turbinate.    Septoplasty:  A Full transfixion incision was performed.  The mucoperichondrial flap was elevated past the bony cartilaginous junction BILATERALLY. Taking care to preserve an adequate dorsal caudal strut, caudal and dorsal cartilaginous incisions were made taking care to preserve the contralateral mucoperichondrial flap. The deviated portion of the cartilaginous  and bony septum was removed.  The transfixion incision was closed with 5-0 fast gut simple, interrupted sutures.  The septal flaps were coapted with a 4-0 plain gut suture on a Aristeo needle.  Findings: Right caudal septal deviation    STENTS PLACED:  None    PACKING PLACED:  Right: Nasopore in ethmoid cavity with 1cc 50:50 mixture of Kenalog and tetracaine  Indications: prevent scaring, deliver medication  Left: Nasopore in ethmoid cavity with 1cc 50:50 mixture of Kenalog and tetracaine  Indications: prevent scaring, deliver medication    HEMOSTASIS:  Epinephrine-soaked pledgets which were removed.     Nasapore applied.    PROCEDURE TERMINATION:  Counts correct.  Eyes checked for bruising.  Stomach suctioned with orogastric tube.  Face cleaned.      COMPLICATIONS:  None    EBL:  30cc    SPECIMENS SENT:  Pathology: sinus contents  Cultures: none    DISPOSITION: Pt will be discharged home and f/u in one week.     Surgery Related Medications:    1. Doxycycline  2. Norco  3. Zofran  4. Budesonide nasal irrigation rinses

## 2022-04-25 NOTE — PATIENT INSTRUCTIONS
Postoperative Sinus and Nasal Surgery Instructions     Sleep with your head slightly elevated for 2-3 days.  No heavy lifting or straining for 7 days.  Do not blow your nose or sniff forcefully.  Sneeze with your mouth open if possible.  It is OK to wipe your nose gently.  It is normal to have mild bloody drainage for the first 24 to 72 hours.  If the bleeding worsens or persists, sit up and spray your nose with Afrin or generic oxymetazoline, 2-3 sprays in each side.  If nasal bleeding still does not stop, or if you have a constant drip of clear fluid from your nose, call your physician.  Starting the morning after surgery, unless you are instructed otherwise, wash your nose out with salt water twice a day per the instructions on the NASAL SALINE INSTRUCTION sheet.  Continue this until you are told it is okay to stop.  THIS IS VERY IMPORTANT FOR PROPER HEALING AND WILL MAKE CLEANING AT YOUR FIRST POSTOP VISIT MUCH EASIER FOR YOU.  Use your nasal steroid spray twice a day after irrigating with salt water.  Take your antibiotic or oral steroid pills if prescribed.  Take pain medicine as needed.  If the pain is mild, you may use Tylenol or generic acetaminophen.  Avoid aspirin or other anti-inflammatory medications.  If you have excessive or persistent pain, call your physician.  If you have any trouble with your vision or eye movement, or if you have bruising or swelling around the eyes, call your physician.  If you have persistent fever over 100°, call your physician.  Your first appointment will be approximately two weeks after your surgery.  In the unlikely event that you have a stent placed that needs to be removed, you will be given an appointment sooner.    For Questions or Emergency Care:  Call the office at [phone].  You may need to speak with the doctor on-call.      Sinus and Nasal Surgery     This information is provided to help you understand sinus and nasal surgery and to answer some of your  questions.  It is not meant to replace a discussion with your doctor and our clinical team.    SINUSES:  The sinuses are four cavities or rooms opening from each side of the nose.  The four sinuses are named maxillary, ethmoid, frontal, and sphenoid.  The purpose of sinuses is not completely understood.  Sinuses normally produce about a quart of mucus every day.  Sinus infections are one of the most common problems that cause a patient to go to a doctor.  Blockage of the sinuses due to a virus or allergies is one of several ways that sinus infections can occur.     Reasons for Sinus Surgery:  Decrease the severity and number of sinus infections  Make it easier to manage or cure sinus infections  Improve breathing if polyps are present  Prevent complications associated with blocked sinuses    Balloon Sinus Surgery:  Some patients may be a candidate for balloon sinus surgery.   In these cases, a balloon is used to dilate the sinus opening rather than the traditional method of removing some bone and soft tissue from the sinus opening.   We will use the most appropriate technique for your situation.    Expectations After Sinus Surgery:  Sinus surgery alone does not always completely cure the sinus problems.  Most people are much better after sinus surgery, but most patients will still need other medical treatment to keep the sinuses in the best possible shape.  Sinus surgery does not cure allergies.  Do not be discouraged if you do not feel better right away after the sinus surgery.  The sinus surgery just opens up the sinuses, but the lining that has been damaged by infections may take weeks or months to heal so that the sinuses work better again.  Be aware that if you have lost your sense of smell from sinus problems, it does not always return after sinus surgery.  Drainage down the back of your throat usually improves but rarely goes away completely.    Alternatives to Sinus Surgery:  You should have received  treatment with all reasonable medical options before considering sinus surgery.  If your condition is not dangerous or significantly affecting your quality of life, there is the option to just live with the problem.    Risks from Sinus Surgery:  Any surgery has some amount of risk associated with it.  Your health care provider will discuss these risks in detail with you.  This list does not include every single side effect that could possibly occur.    Uncommon:  Nosebleeds in the first few days after surgery  Recurrence of polyps or sinus infections  Rare:  Injury to the eye causing double vision or blindness  Spinal fluid leak  Loss of sense of smell  Brain injury or death    NASAL SEPTUM:  The septum of the nose is a wall made of cartilage and bone that divides the two sides of the nose.  The septum can be deviated or bent due to a broken nose or sometimes it just develops that way.  A deviated septum generally creates breathing problems on one side of the nose and does not change from time to time.    Reasons for Septal Surgery:  Improve breathing through the nose.  Reduce snoring.  Allow better access for sinus surgery.    Expectations after Septal Surgery:  Most people can breathe better through their nose after septal surgery.  It is impossible to get human tissue completely straight, but we can generally straighten your septum enough to help you breathe.  You may not breathe perfectly through your nose, but in most circumstances it will be a lot better.  You may still need other medical treatment for other conditions such as allergy.  Septal surgery does not cure allergies.    Alternatives to Septal Surgery:  You should have received treatment with all reasonable medical options before considering septal surgery.  There are not many good medical options other than surgery for a deviated septum.  If your septal deviation does not bother you much, there is the option to just live with the problem.    Risks  from Septal Surgery:  Any surgery has some amount of risk associated with it.  Your health care provider will discuss these risks in detail with you.  This list does not include every single side effect that could possibly occur.    Uncommon  Nosebleeds in the first few days after surgery  Failure to relieve the blockage or recurrence of the blockage  Rare  Change in the outside appearance of the nose  A hole in the septum that may cause crusting, bleeding, and a whistling noise    TURBINATES:  The turbinates of the nose are found on each side of the nasal cavity.  There are four turbinates on each side of the nose, but the lowest or inferior turbinates are the ones most likely to cause problems with the nose.   The turbinates are bones covered by lining that can shrink and swell due to allergies, temperature or humidity changes, and a variety of irritants.  They filter, warm, and humidify the air.  Sometimes the turbinates get too large and block breathing.  They usually block breathing on both sides of the nose, but the blockage may be worse on one side of the other depending on the time of the day.    Reasons for Turbinate Surgery:  Improve breathing through the nose.  Reduce snoring.    Expectations after Turbinate Surgery:  Most people can breathe better through their nose after turbinate surgery.  We can only remove a limited portion of the turbinates, but we can generally remove enough to help you breathe.  You may not breathe perfectly through your nose, but in most circumstances it will be a lot better.  You may still need other medical treatment for other conditions such as allergy.  Turbinate surgery does not cure allergies.    Alternatives to Turbinate Surgery:  You should have received treatment with all reasonable medical options before considering turbinate surgery.  If your nasal blockage does not bother you much, there is the option to just live with the problem.    Risks from Turbinate  Surgery:  Any surgery has some amount of risk associated with it.  Your health care provider will discuss these risks in detail with you.  This list does not include every single side effect that could possibly occur.    Uncommon  Nosebleeds in the first few days after surgery  Failure to relieve the blockage or recurrence of the blockage  Rare  Dryness and crusting of the nose    Can all of these surgeries be done at once?  Yes, if needed.  The sinuses, septum, and turbinates may be operated on at the same time or separately depending on the patients problem.   The doctor, physicians assistant, or nurse practitioner will explain to you which structures will be operated on in your case.     What will happen before surgery?    X-rays:  You will usually have a CT scan of your sinuses prior to sinus surgery; however, you may not if you are just having turbinate or septal surgery.  If you have had sinus surgery before, have bad polyps, or have a complicated case, you will usually have a special CT scan done that allows us to do the surgery with special x-ray guidance in the operating room.  This is called image-guided surgery.  It is a lot like having a GPS map in a car or on a boat to help guide you.  It helps us tell how to get around in your sinuses better.  It may make your surgery safer and allow us to do a more complete surgery in difficult cases.    Surgery Date:  Our surgery scheduler will work with you to find a convenient date for your surgery.  When you call our clinic the day before surgery, you will be told where and when to arrive for surgery and given any instructions such as to when to stop eating or drinking.    Pre-operative Evaluation:  You will be sent to the anesthesiology preop clinic or contacted by phone to be evaluated by the anesthesiology team prior to your surgery date.    Medications:  When at all possible, we will give you prescriptions for all medications you will need following surgery  so you can get them filled before your surgery date if you wish.    Anesthetic:  The surgery is done in most cases with the patient completely asleep (general anesthesia).      Surgery Location and Setting:  The surgery is usually done as an outpatient such that the patient does not have to spend the night in the hospital.  The patient can usually go home a few hours after surgery unless the patient has other medical problems that require staying in the hospital overnight.  We do surgery at the [facility].    After Surgery:  You will be given a postoperative instruction sheet.  It is important that you follow these instructions closely.  You will be given an appointment for your first postoperative visit either at the time we schedule your surgery or before you leave the hospital.  It will usually be two weeks after surgery.   It is important that you keep this appointment.  Packing is not used in the nose unless there is more bleeding than usual.  Instead a powder or absorbable sponge is used in the nose that does not have to be removed.  This material is slowly washed out when you do your nasal irrigations  Generally, the surgery is not very painful.  However, the patient may be uncomfortable due to nasal stuffiness like they might have with a bad cold.  However, pain medicine will be provided just in case.  The patient may feel nauseated or groggy the first day after surgery.  The patient should be able to get around the house alone the first day after surgery.  It is advisable to have someone stay with the patient the night of surgery.  If you must travel a long way to go home, you may want to consider staying in the area for one night so the patient does not have such a long trip.  Most patients can go back to work with light activity in 5 days or less.  Patients with more strenuous jobs that might require heavy lifting or straining should wait about 10 days to go back.    Things to  Call Us About After Surgery:  High fever  Excessive or persistent pain  Constant drip of clear fluid from your nose  Persistent nosebleeds  Any trouble with your vision or eye movement    For Questions or Emergency Care:    Call the office at [phone]. You may need to speak with the doctor on-call.      Steroid Nasal Irrigation      Steroid nasal irrigation is generally used to maximize the health of the sinuses in persons with a history of chronic sinus problems when they do not respond to typical medical treatment or after your sinus surgery to help aid in healing of the sinus cavities.  Your doctor will give you a prescription for the steroid solution that is best for your symptoms.    Instructions:    1.  A prescription from a specialized compounding pharmacy will be sent for saline and steroid irrigations which greatly help in your recovery from sinus surgery by reducing swelling and cleaning he nasal cavity and sinuses     5.  Fill the bottle or syringe with the entire steroid mixture and salt packet per instructions from the pharmacy.     6.  Insert either a squeeze bottle (preferred), a baby bulb syringe, or a water        pik attachment gently into your nose an inch or less.      7.  Lean your head over a sink.     8.  Wash out your nose with half of the mixture on one side and the other half             on the other side.     9.  Perform nasal irrigation once or twice a day as instructed as long as directed.     10.  If you use regular nasal saline (salt water) irrigations also, you should           always use the steroid mixture after the regular saline rinse.      11.  Change out the bottle, bulb, or syringe every two weeks.        For Questions or Emergency Care:  Call the office at [phone]. You may need to speak with the doctor on-call.

## 2022-04-25 NOTE — DISCHARGE INSTRUCTIONS
Postoperative Sinus and Nasal Surgery Instructions  Sleep with your head slightly elevated for 2-3 days.    No heavy lifting or straining for 7 days.    Do not blow your nose or sniff forcefully.    Sneeze with your mouth open if possible.    It is OK to wipe your nose gently  .  It is normal to have mild bloody drainage for the first 24 to 48 hours.  If the bleeding worsens or persists, sit up and spray your nose with Afrin or generic oxymetazoline, 2-3 sprays in each side.  If the bleeding still does not stop, call your physician.    Starting the morning after surgery, unless you are instructed otherwise, wash your nose out with salt water twice a day per the instructions on the NASAL SALINE INSTRUCTION sheet. Continue this until you are told it is okay to stop. THIS IS VERY IMPORTANT FOR PROPER HEALING.    Use your nasal steroid spray, if prescribed, twice a day after irrigating with salt water starting the day after surgery.    Take your antibiotic or oral steroid pills if prescribed.    Take pain medicine as needed. If the pain is mild, you may use Tylenol or generic acetamenophin. Avoid aspirin or other anti-inflammatory medications  .  If you have any trouble with your vision or bruising or swelling around the eyes, call your physician.    You will have either absorbable or removable packing in your nose. If it is removable, you will be given an appointment in the first day or two after surgery to have it removed. For Questions or Emergency Care: Call the office at [phone]. You may need to speak with the doctor on-call. [address] [place] [address] [address]  [address] [place] [address] Phone: [phone]  Fax: [phone]

## 2022-04-25 NOTE — PLAN OF CARE
Dc criteria met. Denies further needs.Oriana po Instructed on fall risk prior to ambulation. Home with  Ramon driving and under his care.

## 2022-04-25 NOTE — OR NURSING
Patient with recent surgeries to zeny hands.  bp cuff to left calf per dr Flores, ok over SCD.  Iv ok to right AC

## 2022-04-25 NOTE — ANESTHESIA PROCEDURE NOTES
Intubation    Date/Time: 4/25/2022 8:27 AM  Performed by: Annika Jerome CRNA  Authorized by: Luis Flores MD     Intubation:     Induction:  Intravenous    Intubated:  Postinduction    Mask Ventilation:  Easy mask    Attempts:  1    Attempted By:  CRNA    Method of Intubation:  Direct    Blade:  Mark 3    Laryngeal View Grade: Grade I - full view of cords      Difficult Airway Encountered?: No      Complications:  None    Airway Device:  Oral endotracheal tube    Airway Device Size:  7.0    Style/Cuff Inflation:  Cuffed (inflated to minimal occlusive pressure)    Secured at:  The lips    Placement Verified By:  Capnometry    Complicating Factors:  None and poor neck/head extension    Findings Post-Intubation:  BS equal bilateral and atraumatic/condition of teeth unchanged

## 2022-04-25 NOTE — ANESTHESIA PREPROCEDURE EVALUATION
04/25/2022  Francesca Garcia is a 51 y.o., female.      Pre-op Assessment    I have reviewed the Patient Summary Reports.     I have reviewed the Nursing Notes. I have reviewed the NPO Status.   I have reviewed the Medications.     Review of Systems  Anesthesia Hx:  Denies Family Hx of Anesthesia complications.  Personal Hx of Anesthesia complications, Post-Operative Nausea/Vomiting, in the past, but not with recent anesthetics / prophylaxis   EENT/Dental:   Chronic sinusitis   Musculoskeletal:  Spine Disorders: cervical Disc disease    Neurological:   Peripheral Neuropathy    Endocrine:  Obesity / BMI > 30      Physical Exam  General: Cooperative, Alert and Oriented    Airway:  Mallampati: II   Mouth Opening: Normal  TM Distance: Normal  Tongue: Normal  Neck ROM: Extension Decreased    Dental:  Intact    Chest/Lungs:  Normal Respiratory Rate    Heart:  Rate: Normal  Rhythm: Regular Rhythm        Anesthesia Plan  Type of Anesthesia, risks & benefits discussed:    Anesthesia Type: Gen ETT  Intra-op Monitoring Plan: Standard ASA Monitors  Post Op Pain Control Plan: multimodal analgesia  Induction:  IV  Airway Plan: Direct, Post-Induction  Informed Consent: Informed consent signed with the Patient and all parties understand the risks and agree with anesthesia plan.  All questions answered.   ASA Score: 2    Ready For Surgery From Anesthesia Perspective.     .

## 2022-04-26 VITALS
TEMPERATURE: 99 F | OXYGEN SATURATION: 97 % | RESPIRATION RATE: 18 BRPM | DIASTOLIC BLOOD PRESSURE: 93 MMHG | SYSTOLIC BLOOD PRESSURE: 151 MMHG | WEIGHT: 220 LBS | HEART RATE: 94 BPM | HEIGHT: 71 IN | BODY MASS INDEX: 30.8 KG/M2

## 2022-04-28 LAB
FINAL PATHOLOGIC DIAGNOSIS: NORMAL
GROSS: NORMAL
Lab: NORMAL

## 2022-05-31 ENCOUNTER — PATIENT MESSAGE (OUTPATIENT)
Dept: ADMINISTRATIVE | Facility: HOSPITAL | Age: 52
End: 2022-05-31
Payer: COMMERCIAL

## 2022-08-04 ENCOUNTER — HOSPITAL ENCOUNTER (OUTPATIENT)
Dept: RADIOLOGY | Facility: HOSPITAL | Age: 52
Discharge: HOME | End: 2022-08-04
Attending: NEUROLOGICAL SURGERY
Payer: COMMERCIAL

## 2022-08-04 DIAGNOSIS — M54.12 RADICULOPATHY, CERVICAL REGION: Primary | ICD-10-CM

## 2022-08-04 DIAGNOSIS — M54.12 RADICULOPATHY, CERVICAL REGION: ICD-10-CM

## 2022-08-04 PROCEDURE — 72040 X-RAY EXAM NECK SPINE 2-3 VW: CPT | Mod: TC,PO

## 2025-03-25 ENCOUNTER — TELEPHONE (OUTPATIENT)
Dept: SURGERY | Facility: CLINIC | Age: 55
End: 2025-03-25
Payer: COMMERCIAL

## 2025-03-25 NOTE — TELEPHONE ENCOUNTER
I contacted patient regarding her visit on Thursday saying clearance for surgery.  States that she has problems with anesthesia, had a fall and was having pain, etc.  She needs a nerve injection and the doctor wants her to be cleared by a surgeon as far as checking her abdomen, colon and spleen so basically having a CT done.  Advised that Dr Panda can certainly check her and order a CT but he will not be able to give her a clearance to have anesthesia.  Patient voices understanding and is ok to come in to have the CT ordered.

## 2025-03-27 ENCOUNTER — OFFICE VISIT (OUTPATIENT)
Dept: SURGERY | Facility: CLINIC | Age: 55
End: 2025-03-27
Payer: COMMERCIAL

## 2025-03-27 VITALS
BODY MASS INDEX: 30.8 KG/M2 | SYSTOLIC BLOOD PRESSURE: 136 MMHG | HEART RATE: 76 BPM | HEIGHT: 71 IN | DIASTOLIC BLOOD PRESSURE: 85 MMHG | RESPIRATION RATE: 16 BRPM | TEMPERATURE: 98 F | WEIGHT: 220 LBS

## 2025-03-27 DIAGNOSIS — M54.2 NECK PAIN: Primary | ICD-10-CM

## 2025-03-27 PROCEDURE — 99999 PR PBB SHADOW E&M-EST. PATIENT-LVL III: CPT | Mod: PBBFAC,,, | Performed by: STUDENT IN AN ORGANIZED HEALTH CARE EDUCATION/TRAINING PROGRAM

## 2025-03-27 PROCEDURE — 99203 OFFICE O/P NEW LOW 30 MIN: CPT | Mod: S$GLB,,, | Performed by: STUDENT IN AN ORGANIZED HEALTH CARE EDUCATION/TRAINING PROGRAM

## 2025-03-27 RX ORDER — MORPHINE SULFATE 15 MG/1
7.5 TABLET ORAL DAILY PRN
COMMUNITY
Start: 2025-03-07

## 2025-03-27 NOTE — PROGRESS NOTES
History & Physical    Subjective     History of Present Illness:  Patient is a 54 y.o. female presents after a fall approximately 3 years ago.  She was having some abdominal complaints around the time of her injury but her biggest complaint now is neck pain.  She has had epidural injections previously.  She was referred to me for surgical clearance to have additional neck injections it sounds like.  Sounds like she also had some trouble with anesthesia emergence    No chief complaint on file.      Review of patient's allergies indicates:  No Known Allergies    Current Medications[1]    Past Medical History:   Diagnosis Date    PONV (postoperative nausea and vomiting)     Sinus congestion      Past Surgical History:   Procedure Laterality Date    BREAST BIOPSY Left     not sure of date-@ 's office    CARPAL TUNNEL RELEASE Bilateral     right Mar, 2022 and left      SECTION      FUNCTIONAL ENDOSCOPIC SINUS SURGERY (FESS) USING COMPUTER-ASSISTED NAVIGATION Bilateral 2022    Procedure: FESS, USING COMPUTER-ASSISTED NAVIGATION;  Surgeon: Kevin Tanner MD;  Location: [facility] OR;  Service: ENT;  Laterality: Bilateral;    KIDNEY STONE SURGERY      NASAL SEPTOPLASTY Bilateral 2022    Procedure: SEPTOPLASTY, NOSE;  Surgeon: Kevin Tanner MD;  Location: [facility] OR;  Service: ENT;  Laterality: Bilateral;    NASAL TURBINATE REDUCTION Bilateral 2022    Procedure: REDUCTION, NASAL TURBINATE;  Surgeon: Kevin Tanner MD;  Location: [facility] OR;  Service: ENT;  Laterality: Bilateral;    TOTAL REPLACEMENT OF CERVICAL INTERVERTEBRAL DISC       Family History   Problem Relation Name Age of Onset    Cancer Maternal Aunt       Social History[2]     Review of Systems:  Review of Systems   Constitutional:  Negative for fever.   HENT: Negative.     Eyes: Negative.    Respiratory: Negative.     Cardiovascular: Negative.    Gastrointestinal: Negative.    Endocrine: Negative.   "  Genitourinary: Negative.    Musculoskeletal:  Positive for neck pain.   Skin: Negative.    Allergic/Immunologic: Negative.    Neurological: Negative.    Hematological: Negative.    Psychiatric/Behavioral: Negative.            Objective     Vital Signs (Most Recent)  Temp: 98 °F (36.7 °C) (03/27/25 1117)  Pulse: 76 (03/27/25 1117)  Resp: 16 (03/27/25 1117)  BP: 136/85 (03/27/25 1117)  5' 10.5" (1.791 m)  99.8 kg (220 lb 0.3 oz)     Physical Exam:  Physical Exam  Constitutional:       General: She is not in acute distress.     Appearance: Normal appearance. She is not ill-appearing, toxic-appearing or diaphoretic.   HENT:      Head: Normocephalic.      Nose: Nose normal.   Eyes:      Conjunctiva/sclera: Conjunctivae normal.   Cardiovascular:      Rate and Rhythm: Normal rate and regular rhythm.   Pulmonary:      Effort: Pulmonary effort is normal.   Abdominal:      Palpations: Abdomen is soft.   Musculoskeletal:         General: Normal range of motion.      Cervical back: Normal range of motion.   Skin:     General: Skin is warm.   Neurological:      General: No focal deficit present.      Mental Status: She is alert.   Psychiatric:         Mood and Affect: Mood normal.            Assessment and Plan   54-year-old female who was referred to me for possible surgical clearance for what sounds like an epidural neck injection related to chronic neck pain from a fall 3 years ago.  At the time of her fall, she was having some GI discomfort and symptoms.  Does not sound like she has any symptoms currently.    PLAN:  I did discuss that there is no general surgical clearance for chronic neck pain and subsequent injections or neck surgery.  If there were concerns about anesthesia, she should discuss this with her pain doctor and the anesthesiology team that is going to perform any kind of sedation or intervention for her.  No indications for CT imaging of the abdomen without acute complaints.  If there are complaints, she " should follow up with her PCP for additional imaging and workup.  There should be no reason why CT imaging is needed for neck injections.                [1]   Current Outpatient Medications   Medication Sig Dispense Refill    morphine (MSIR) 15 MG tablet Take 7.5 mg by mouth daily as needed.      psyllium 0.52 gram capsule Fiber (psyllium husk) 0.52 gram capsule   Take 4 capsules every day by oral route for 30 days.       No current facility-administered medications for this visit.   [2]   Social History  Tobacco Use    Smoking status: Former     Types: Cigarettes    Smokeless tobacco: Never

## (undated) DEVICE — KIT ANTIFOG W/SPONG & FLUID

## (undated) DEVICE — GLOVE SURG ULTRA TOUCH 7

## (undated) DEVICE — SKIN MARKER STER DUAL TIP

## (undated) DEVICE — SEE L#120831

## (undated) DEVICE — BLANKET FULL BODY 85.8X50IN

## (undated) DEVICE — BLADE SURG #15 CARBON STEEL

## (undated) DEVICE — DRESSING EYE OVAL LF

## (undated) DEVICE — DEVICE INFLATION SE SINUS BLLN

## (undated) DEVICE — SPONGE PATTY SURGICAL .5X3IN

## (undated) DEVICE — STAPLER SKIN ROTATING HEAD

## (undated) DEVICE — SHEET DRAPE MEDIUM

## (undated) DEVICE — TUBE SUMP NASOGASTRIC 14F

## (undated) DEVICE — COLLECTOR SPECIMAN ARTHRO

## (undated) DEVICE — BLLN SYS SIMUPLASTY 6MM

## (undated) DEVICE — SUT PLN GUT 4-0 SC-1SC-1 1

## (undated) DEVICE — TUBING SUCTION STR .25INX6FT

## (undated) DEVICE — TUBING SUCTION 3/16X6 2 CONN

## (undated) DEVICE — TRACKER PATIENT NON INVASIVE

## (undated) DEVICE — SYR ONLY ET 20CC

## (undated) DEVICE — NDL 27G X 1 1/4

## (undated) DEVICE — TUBE NG PREVENT FILTER 14FR

## (undated) DEVICE — TRACKER ENT INSTRUMENT

## (undated) DEVICE — DRESSING TELFA STRL 4X3 LF

## (undated) DEVICE — BLADE INFERIOR TURBINATE 2MM

## (undated) DEVICE — DRESSING NASOPORE 8CM BIORSRBL

## (undated) DEVICE — SOL NS 1000CC

## (undated) DEVICE — SUT PLAIN 4-0 SC-1 18IN

## (undated) DEVICE — SYR 10CC LUER LOCK

## (undated) DEVICE — PACK HEAD & NECK

## (undated) DEVICE — SUT 5/0 18IN PLAIN FAST AB

## (undated) DEVICE — NDL HYPODERMIC BLUNT 18G 1.5IN

## (undated) DEVICE — SYS LABEL CORRECT MED

## (undated) DEVICE — BLADE TRICUT